# Patient Record
Sex: MALE | Race: WHITE | NOT HISPANIC OR LATINO | Employment: FULL TIME | URBAN - METROPOLITAN AREA
[De-identification: names, ages, dates, MRNs, and addresses within clinical notes are randomized per-mention and may not be internally consistent; named-entity substitution may affect disease eponyms.]

---

## 2021-11-30 ENCOUNTER — NON-PROVIDER VISIT (OUTPATIENT)
Dept: OCCUPATIONAL MEDICINE | Facility: CLINIC | Age: 46
End: 2021-11-30

## 2021-11-30 DIAGNOSIS — Z02.1 PRE-EMPLOYMENT DRUG SCREENING: ICD-10-CM

## 2021-11-30 LAB
AMP AMPHETAMINE: NORMAL
COC COCAINE: NORMAL
INT CON NEG: NORMAL
INT CON POS: NORMAL
MET METHAMPHETAMINES: NORMAL
OPI OPIATES: NORMAL
PCP PHENCYCLIDINE: NORMAL
POC DRUG COMMENT 753798-OCCUPATIONAL HEALTH: NORMAL
THC: NORMAL

## 2021-11-30 PROCEDURE — 80305 DRUG TEST PRSMV DIR OPT OBS: CPT | Performed by: PREVENTIVE MEDICINE

## 2024-06-11 ENCOUNTER — HOSPITAL ENCOUNTER (OUTPATIENT)
Dept: RADIOLOGY | Facility: MEDICAL CENTER | Age: 49
End: 2024-06-11

## 2024-06-11 ENCOUNTER — APPOINTMENT (OUTPATIENT)
Dept: RADIOLOGY | Facility: MEDICAL CENTER | Age: 49
DRG: 603 | End: 2024-06-11
Attending: EMERGENCY MEDICINE
Payer: COMMERCIAL

## 2024-06-11 ENCOUNTER — HOSPITAL ENCOUNTER (INPATIENT)
Facility: MEDICAL CENTER | Age: 49
LOS: 1 days | DRG: 603 | End: 2024-06-12
Attending: EMERGENCY MEDICINE | Admitting: STUDENT IN AN ORGANIZED HEALTH CARE EDUCATION/TRAINING PROGRAM
Payer: COMMERCIAL

## 2024-06-11 DIAGNOSIS — K11.1 ENLARGEMENT OF SUBMANDIBULAR GLAND: ICD-10-CM

## 2024-06-11 DIAGNOSIS — R22.1 NECK SWELLING: ICD-10-CM

## 2024-06-11 DIAGNOSIS — R22.1 NECK MASS: ICD-10-CM

## 2024-06-11 PROBLEM — D72.825 BANDEMIA: Status: ACTIVE | Noted: 2024-06-11

## 2024-06-11 LAB
ALBUMIN SERPL BCP-MCNC: 3.7 G/DL (ref 3.2–4.9)
ALBUMIN/GLOB SERPL: 1.1 G/DL
ALP SERPL-CCNC: 74 U/L (ref 30–99)
ALT SERPL-CCNC: 12 U/L (ref 2–50)
ANION GAP SERPL CALC-SCNC: 15 MMOL/L (ref 7–16)
AST SERPL-CCNC: 15 U/L (ref 12–45)
BASOPHILS # BLD AUTO: 0.5 % (ref 0–1.8)
BASOPHILS # BLD: 0.06 K/UL (ref 0–0.12)
BILIRUB SERPL-MCNC: 0.2 MG/DL (ref 0.1–1.5)
BUN SERPL-MCNC: 9 MG/DL (ref 8–22)
CALCIUM ALBUM COR SERPL-MCNC: 9 MG/DL (ref 8.5–10.5)
CALCIUM SERPL-MCNC: 8.8 MG/DL (ref 8.5–10.5)
CHLORIDE SERPL-SCNC: 103 MMOL/L (ref 96–112)
CO2 SERPL-SCNC: 17 MMOL/L (ref 20–33)
CREAT SERPL-MCNC: 0.77 MG/DL (ref 0.5–1.4)
EOSINOPHIL # BLD AUTO: 0.02 K/UL (ref 0–0.51)
EOSINOPHIL NFR BLD: 0.2 % (ref 0–6.9)
ERYTHROCYTE [DISTWIDTH] IN BLOOD BY AUTOMATED COUNT: 41.1 FL (ref 35.9–50)
FLUAV RNA SPEC QL NAA+PROBE: NEGATIVE
FLUBV RNA SPEC QL NAA+PROBE: NEGATIVE
GFR SERPLBLD CREATININE-BSD FMLA CKD-EPI: 110 ML/MIN/1.73 M 2
GLOBULIN SER CALC-MCNC: 3.4 G/DL (ref 1.9–3.5)
GLUCOSE SERPL-MCNC: 112 MG/DL (ref 65–99)
HCT VFR BLD AUTO: 39.8 % (ref 42–52)
HGB BLD-MCNC: 14 G/DL (ref 14–18)
IMM GRANULOCYTES # BLD AUTO: 0.07 K/UL (ref 0–0.11)
IMM GRANULOCYTES NFR BLD AUTO: 0.6 % (ref 0–0.9)
LACTATE SERPL-SCNC: 1.3 MMOL/L (ref 0.5–2)
LYMPHOCYTES # BLD AUTO: 2.87 K/UL (ref 1–4.8)
LYMPHOCYTES NFR BLD: 22.8 % (ref 22–41)
MCH RBC QN AUTO: 31.9 PG (ref 27–33)
MCHC RBC AUTO-ENTMCNC: 35.2 G/DL (ref 32.3–36.5)
MCV RBC AUTO: 90.7 FL (ref 81.4–97.8)
MONOCYTES # BLD AUTO: 1.26 K/UL (ref 0–0.85)
MONOCYTES NFR BLD AUTO: 10 % (ref 0–13.4)
NEUTROPHILS # BLD AUTO: 8.29 K/UL (ref 1.82–7.42)
NEUTROPHILS NFR BLD: 65.9 % (ref 44–72)
NRBC # BLD AUTO: 0 K/UL
NRBC BLD-RTO: 0 /100 WBC (ref 0–0.2)
PLATELET # BLD AUTO: 430 K/UL (ref 164–446)
PMV BLD AUTO: 8.9 FL (ref 9–12.9)
POTASSIUM SERPL-SCNC: 4.1 MMOL/L (ref 3.6–5.5)
PROT SERPL-MCNC: 7.1 G/DL (ref 6–8.2)
RBC # BLD AUTO: 4.39 M/UL (ref 4.7–6.1)
RSV RNA SPEC QL NAA+PROBE: NEGATIVE
S PYO DNA SPEC NAA+PROBE: DETECTED
SARS-COV-2 RNA RESP QL NAA+PROBE: NOTDETECTED
SODIUM SERPL-SCNC: 135 MMOL/L (ref 135–145)
WBC # BLD AUTO: 12.6 K/UL (ref 4.8–10.8)

## 2024-06-11 PROCEDURE — 770006 HCHG ROOM/CARE - MED/SURG/GYN SEMI*

## 2024-06-11 PROCEDURE — 36415 COLL VENOUS BLD VENIPUNCTURE: CPT

## 2024-06-11 PROCEDURE — A9270 NON-COVERED ITEM OR SERVICE: HCPCS | Performed by: STUDENT IN AN ORGANIZED HEALTH CARE EDUCATION/TRAINING PROGRAM

## 2024-06-11 PROCEDURE — 700105 HCHG RX REV CODE 258: Performed by: OTOLARYNGOLOGY

## 2024-06-11 PROCEDURE — 700102 HCHG RX REV CODE 250 W/ 637 OVERRIDE(OP): Performed by: STUDENT IN AN ORGANIZED HEALTH CARE EDUCATION/TRAINING PROGRAM

## 2024-06-11 PROCEDURE — 71045 X-RAY EXAM CHEST 1 VIEW: CPT

## 2024-06-11 PROCEDURE — 87651 STREP A DNA AMP PROBE: CPT

## 2024-06-11 PROCEDURE — 87040 BLOOD CULTURE FOR BACTERIA: CPT

## 2024-06-11 PROCEDURE — 83605 ASSAY OF LACTIC ACID: CPT

## 2024-06-11 PROCEDURE — 99223 1ST HOSP IP/OBS HIGH 75: CPT | Performed by: STUDENT IN AN ORGANIZED HEALTH CARE EDUCATION/TRAINING PROGRAM

## 2024-06-11 PROCEDURE — 700105 HCHG RX REV CODE 258: Performed by: STUDENT IN AN ORGANIZED HEALTH CARE EDUCATION/TRAINING PROGRAM

## 2024-06-11 PROCEDURE — 96374 THER/PROPH/DIAG INJ IV PUSH: CPT

## 2024-06-11 PROCEDURE — 0241U HCHG SARS-COV-2 COVID-19 NFCT DS RESP RNA 4 TRGT ED POC: CPT

## 2024-06-11 PROCEDURE — 700111 HCHG RX REV CODE 636 W/ 250 OVERRIDE (IP): Performed by: EMERGENCY MEDICINE

## 2024-06-11 PROCEDURE — 99285 EMERGENCY DEPT VISIT HI MDM: CPT

## 2024-06-11 PROCEDURE — 700111 HCHG RX REV CODE 636 W/ 250 OVERRIDE (IP): Performed by: OTOLARYNGOLOGY

## 2024-06-11 PROCEDURE — 80053 COMPREHEN METABOLIC PANEL: CPT

## 2024-06-11 PROCEDURE — 85025 COMPLETE CBC W/AUTO DIFF WBC: CPT

## 2024-06-11 PROCEDURE — 96375 TX/PRO/DX INJ NEW DRUG ADDON: CPT

## 2024-06-11 RX ORDER — POLYETHYLENE GLYCOL 3350 17 G/17G
1 POWDER, FOR SOLUTION ORAL
Status: DISCONTINUED | OUTPATIENT
Start: 2024-06-11 | End: 2024-06-12 | Stop reason: HOSPADM

## 2024-06-11 RX ORDER — SODIUM CHLORIDE 9 MG/ML
INJECTION, SOLUTION INTRAVENOUS CONTINUOUS
Status: DISCONTINUED | OUTPATIENT
Start: 2024-06-11 | End: 2024-06-12

## 2024-06-11 RX ORDER — PROMETHAZINE HYDROCHLORIDE 25 MG/1
12.5-25 SUPPOSITORY RECTAL EVERY 4 HOURS PRN
Status: DISCONTINUED | OUTPATIENT
Start: 2024-06-11 | End: 2024-06-12 | Stop reason: HOSPADM

## 2024-06-11 RX ORDER — CEFAZOLIN 2 G/1
2 INJECTION, POWDER, FOR SOLUTION INTRAMUSCULAR; INTRAVENOUS ONCE
Status: COMPLETED | OUTPATIENT
Start: 2024-06-11 | End: 2024-06-11

## 2024-06-11 RX ORDER — AMOXICILLIN 250 MG
2 CAPSULE ORAL EVERY EVENING
Status: DISCONTINUED | OUTPATIENT
Start: 2024-06-11 | End: 2024-06-12 | Stop reason: HOSPADM

## 2024-06-11 RX ORDER — ENOXAPARIN SODIUM 100 MG/ML
40 INJECTION SUBCUTANEOUS DAILY
Status: DISCONTINUED | OUTPATIENT
Start: 2024-06-11 | End: 2024-06-12 | Stop reason: HOSPADM

## 2024-06-11 RX ORDER — DEXAMETHASONE SODIUM PHOSPHATE 4 MG/ML
10 INJECTION, SOLUTION INTRA-ARTICULAR; INTRALESIONAL; INTRAMUSCULAR; INTRAVENOUS; SOFT TISSUE EVERY 8 HOURS
Status: DISCONTINUED | OUTPATIENT
Start: 2024-06-11 | End: 2024-06-11

## 2024-06-11 RX ORDER — ACETAMINOPHEN 325 MG/1
650 TABLET ORAL EVERY 6 HOURS PRN
Status: DISCONTINUED | OUTPATIENT
Start: 2024-06-11 | End: 2024-06-12 | Stop reason: HOSPADM

## 2024-06-11 RX ORDER — METHYLPREDNISOLONE SODIUM SUCCINATE 125 MG/2ML
125 INJECTION, POWDER, LYOPHILIZED, FOR SOLUTION INTRAMUSCULAR; INTRAVENOUS ONCE
Status: COMPLETED | OUTPATIENT
Start: 2024-06-11 | End: 2024-06-11

## 2024-06-11 RX ORDER — PROCHLORPERAZINE EDISYLATE 5 MG/ML
5-10 INJECTION INTRAMUSCULAR; INTRAVENOUS EVERY 4 HOURS PRN
Status: DISCONTINUED | OUTPATIENT
Start: 2024-06-11 | End: 2024-06-12 | Stop reason: HOSPADM

## 2024-06-11 RX ORDER — PROMETHAZINE HYDROCHLORIDE 25 MG/1
12.5-25 TABLET ORAL EVERY 4 HOURS PRN
Status: DISCONTINUED | OUTPATIENT
Start: 2024-06-11 | End: 2024-06-12 | Stop reason: HOSPADM

## 2024-06-11 RX ORDER — METHYLPREDNISOLONE SODIUM SUCCINATE 40 MG/ML
40 INJECTION, POWDER, LYOPHILIZED, FOR SOLUTION INTRAMUSCULAR; INTRAVENOUS EVERY 8 HOURS
Qty: 9 ML | Refills: 0 | Status: DISCONTINUED | OUTPATIENT
Start: 2024-06-11 | End: 2024-06-11

## 2024-06-11 RX ORDER — ONDANSETRON 2 MG/ML
4 INJECTION INTRAMUSCULAR; INTRAVENOUS EVERY 4 HOURS PRN
Status: DISCONTINUED | OUTPATIENT
Start: 2024-06-11 | End: 2024-06-12 | Stop reason: HOSPADM

## 2024-06-11 RX ORDER — ONDANSETRON 4 MG/1
4 TABLET, ORALLY DISINTEGRATING ORAL EVERY 4 HOURS PRN
Status: DISCONTINUED | OUTPATIENT
Start: 2024-06-11 | End: 2024-06-12 | Stop reason: HOSPADM

## 2024-06-11 RX ADMIN — SODIUM CHLORIDE: 9 INJECTION, SOLUTION INTRAVENOUS at 18:00

## 2024-06-11 RX ADMIN — DEXAMETHASONE SODIUM PHOSPHATE 10 MG: 4 INJECTION INTRA-ARTICULAR; INTRALESIONAL; INTRAMUSCULAR; INTRAVENOUS; SOFT TISSUE at 16:30

## 2024-06-11 RX ADMIN — SODIUM CHLORIDE: 9 INJECTION, SOLUTION INTRAVENOUS at 13:56

## 2024-06-11 RX ADMIN — METHYLPREDNISOLONE SODIUM SUCCINATE 125 MG: 125 INJECTION, POWDER, FOR SOLUTION INTRAMUSCULAR; INTRAVENOUS at 13:56

## 2024-06-11 RX ADMIN — AMPICILLIN AND SULBACTAM 3 G: 1; 2 INJECTION, POWDER, FOR SOLUTION INTRAMUSCULAR; INTRAVENOUS at 18:01

## 2024-06-11 RX ADMIN — CEFAZOLIN 2 G: 2 INJECTION, POWDER, FOR SOLUTION INTRAMUSCULAR; INTRAVENOUS at 13:59

## 2024-06-11 RX ADMIN — AMPICILLIN AND SULBACTAM 3 G: 1; 2 INJECTION, POWDER, FOR SOLUTION INTRAMUSCULAR; INTRAVENOUS at 23:36

## 2024-06-11 RX ADMIN — SENNOSIDES AND DOCUSATE SODIUM 2 TABLET: 50; 8.6 TABLET ORAL at 18:02

## 2024-06-11 SDOH — ECONOMIC STABILITY: TRANSPORTATION INSECURITY
IN THE PAST 12 MONTHS, HAS LACK OF RELIABLE TRANSPORTATION KEPT YOU FROM MEDICAL APPOINTMENTS, MEETINGS, WORK OR FROM GETTING THINGS NEEDED FOR DAILY LIVING?: NO

## 2024-06-11 SDOH — ECONOMIC STABILITY: TRANSPORTATION INSECURITY
IN THE PAST 12 MONTHS, HAS THE LACK OF TRANSPORTATION KEPT YOU FROM MEDICAL APPOINTMENTS OR FROM GETTING MEDICATIONS?: NO

## 2024-06-11 ASSESSMENT — ENCOUNTER SYMPTOMS
NAUSEA: 0
SHORTNESS OF BREATH: 0
NECK PAIN: 1
COUGH: 0
ABDOMINAL PAIN: 0
CHILLS: 1
FEVER: 1
VOMITING: 0

## 2024-06-11 ASSESSMENT — COGNITIVE AND FUNCTIONAL STATUS - GENERAL
MOBILITY SCORE: 24
SUGGESTED CMS G CODE MODIFIER MOBILITY: CH
SUGGESTED CMS G CODE MODIFIER DAILY ACTIVITY: CH
DAILY ACTIVITIY SCORE: 24

## 2024-06-11 ASSESSMENT — SOCIAL DETERMINANTS OF HEALTH (SDOH)
WITHIN THE LAST YEAR, HAVE TO BEEN RAPED OR FORCED TO HAVE ANY KIND OF SEXUAL ACTIVITY BY YOUR PARTNER OR EX-PARTNER?: NO
WITHIN THE PAST 12 MONTHS, YOU WORRIED THAT YOUR FOOD WOULD RUN OUT BEFORE YOU GOT THE MONEY TO BUY MORE: NEVER TRUE
IN THE PAST 12 MONTHS, HAS THE ELECTRIC, GAS, OIL, OR WATER COMPANY THREATENED TO SHUT OFF SERVICE IN YOUR HOME?: NO
WITHIN THE LAST YEAR, HAVE YOU BEEN KICKED, HIT, SLAPPED, OR OTHERWISE PHYSICALLY HURT BY YOUR PARTNER OR EX-PARTNER?: NO
IN THE PAST 12 MONTHS, HAS THE ELECTRIC, GAS, OIL, OR WATER COMPANY THREATENED TO SHUT OFF SERVICE IN YOUR HOME?: NO
WITHIN THE LAST YEAR, HAVE YOU BEEN AFRAID OF YOUR PARTNER OR EX-PARTNER?: NO
WITHIN THE PAST 12 MONTHS, THE FOOD YOU BOUGHT JUST DIDN'T LAST AND YOU DIDN'T HAVE MONEY TO GET MORE: NEVER TRUE
WITHIN THE PAST 12 MONTHS, YOU WORRIED THAT YOUR FOOD WOULD RUN OUT BEFORE YOU GOT THE MONEY TO BUY MORE: NEVER TRUE
WITHIN THE LAST YEAR, HAVE YOU BEEN HUMILIATED OR EMOTIONALLY ABUSED IN OTHER WAYS BY YOUR PARTNER OR EX-PARTNER?: NO
WITHIN THE PAST 12 MONTHS, THE FOOD YOU BOUGHT JUST DIDN'T LAST AND YOU DIDN'T HAVE MONEY TO GET MORE: NEVER TRUE

## 2024-06-11 ASSESSMENT — LIFESTYLE VARIABLES
AVERAGE NUMBER OF DAYS PER WEEK YOU HAVE A DRINK CONTAINING ALCOHOL: 0
HAVE PEOPLE ANNOYED YOU BY CRITICIZING YOUR DRINKING: NO
ON A TYPICAL DAY WHEN YOU DRINK ALCOHOL HOW MANY DRINKS DO YOU HAVE: 0
ALCOHOL_USE: NO
TOTAL SCORE: 0
DOES PATIENT WANT TO STOP DRINKING: NO
CONSUMPTION TOTAL: NEGATIVE
TOTAL SCORE: 0
EVER HAD A DRINK FIRST THING IN THE MORNING TO STEADY YOUR NERVES TO GET RID OF A HANGOVER: NO
EVER FELT BAD OR GUILTY ABOUT YOUR DRINKING: NO
TOTAL SCORE: 0
HAVE YOU EVER FELT YOU SHOULD CUT DOWN ON YOUR DRINKING: NO
HOW MANY TIMES IN THE PAST YEAR HAVE YOU HAD 5 OR MORE DRINKS IN A DAY: 0

## 2024-06-11 ASSESSMENT — PAIN DESCRIPTION - PAIN TYPE
TYPE: ACUTE PAIN
TYPE: ACUTE PAIN

## 2024-06-11 ASSESSMENT — PATIENT HEALTH QUESTIONNAIRE - PHQ9
SUM OF ALL RESPONSES TO PHQ9 QUESTIONS 1 AND 2: 0
2. FEELING DOWN, DEPRESSED, IRRITABLE, OR HOPELESS: NOT AT ALL
1. LITTLE INTEREST OR PLEASURE IN DOING THINGS: NOT AT ALL

## 2024-06-11 ASSESSMENT — FIBROSIS 4 INDEX
FIB4 SCORE: 0.48
FIB4 SCORE: 0.48

## 2024-06-11 NOTE — ED TRIAGE NOTES
Chief Complaint   Patient presents with    Sent by MD     Sent from [place] by private vehicle. for swollen left neck lymph node x5 days after using massager on neck about 5 days ago. Fevers and sore neck for ten days off and on resolved a couple days ago. Hx of dental surgery in December.      [facility]    A/ox4. No signs of acute distress. Educated on Triage Process and wait time. Placed in lobby with instructions to notify staff of any change in condition.

## 2024-06-11 NOTE — ED NOTES
Pt ambulated to room [facility] 18 with steady gait. Pt placed on monitors. Chart up for ERP.     Urine sample collected and sent to lab.

## 2024-06-11 NOTE — ED PROVIDER NOTES
"ER Provider Note    Scribed for Mac Carrillo D.O. by Jaren Lowe. 6/11/2024  12:14 PM    Primary Care Provider: Pcp Pt States None    CHIEF COMPLAINT  Chief Complaint   Patient presents with    Sent by MD     Sent from [place] by private vehicle. for swollen left neck lymph node x5 days after using massager on neck about 5 days ago. Fevers and sore neck for ten days off and on resolved a couple days ago. Hx of dental surgery in December.       HPI/ROS  LIMITATION TO HISTORY   None    OUTSIDE HISTORIAN(S):  None    Paulo Fournier is a 48 y.o. male who presents to the Emergency Department for neck swelling for a couple of days. The patient was sent here from [place] and had a CT done there. His story is that he had tension in his neck about 5 days ago, and then attempted to alleviate it with a massager. When he woke up this morning, he states that his neck had significant swelling. He denies neck pain, dental pain, or issues with swallowing or breathing.     ROS as per HPI.    PAST MEDICAL HISTORY  History reviewed. No pertinent past medical history.    SURGICAL HISTORY  History reviewed. No pertinent surgical history.    FAMILY HISTORY  History reviewed. No pertinent family history.    SOCIAL HISTORY   reports that he has never smoked. He has never used smokeless tobacco. He reports that he does not currently use alcohol. He reports current drug use.    CURRENT MEDICATIONS  Previous Medications    No medications noted.     ALLERGIES  Patient has no known allergies.    PHYSICAL EXAM  /79   Pulse 71   Temp 37.8 °C (100 °F) (Temporal)   Resp 18   Ht 1.778 m (5' 10\")   Wt 73.1 kg (161 lb 2.5 oz)   SpO2 100%   BMI 23.12 kg/m²     General: No acute distress.  HENT: Normocephalic, Mucus membranes are moist. Left anterior lateral neck swelling. No erythema. No tenderness. Pharynx is normal.   Chest: Lungs have even and unlabored respirations, Clear to auscultation. No stridor.   Cardiovascular: " Regular rate and regular rhythm, No peripheral cyanosis.  Abdomen: Non distended.  Neuro: Awake, Conversive, Able to relay recent events.  Psychiatric: Calm and cooperative.     EXTERNAL RECORDS REVIEWED  Review of patient's past medical records shows a CT with an 8 mm mass. Left aspect of the epiglottis is thickened. Laryngeal airway is partially effaced. Bilateral cervical adenopathy is greatest on the left. Diffuse soft tissue swelling. Left submandibular gland is enlarged.     INITIAL ASSESSMENT  This patient presents with neck swelling. CT shows no mass, but there is surrounding signs of inflammation. He will be started on IV antibiotics and will admit and consult ENT.     ED Observation Status? No; Patient does not meet criteria for ED Observation.     DIAGNOSTIC STUDIES    Labs:   Results for orders placed or performed during the hospital encounter of 06/11/24   COMP METABOLIC PANEL   Result Value Ref Range    Sodium 135 135 - 145 mmol/L    Potassium 4.1 3.6 - 5.5 mmol/L    Chloride 103 96 - 112 mmol/L    Co2 17 (L) 20 - 33 mmol/L    Anion Gap 15.0 7.0 - 16.0    Glucose 112 (H) 65 - 99 mg/dL    Bun 9 8 - 22 mg/dL    Creatinine 0.77 0.50 - 1.40 mg/dL    Calcium 8.8 8.5 - 10.5 mg/dL    Correct Calcium 9.0 8.5 - 10.5 mg/dL    AST(SGOT) 15 12 - 45 U/L    ALT(SGPT) 12 2 - 50 U/L    Alkaline Phosphatase 74 30 - 99 U/L    Total Bilirubin 0.2 0.1 - 1.5 mg/dL    Albumin 3.7 3.2 - 4.9 g/dL    Total Protein 7.1 6.0 - 8.2 g/dL    Globulin 3.4 1.9 - 3.5 g/dL    A-G Ratio 1.1 g/dL   Lactic Acid   Result Value Ref Range    Lactic Acid 1.3 0.5 - 2.0 mmol/L   CBC with Differential   Result Value Ref Range    WBC 12.6 (H) 4.8 - 10.8 K/uL    RBC 4.39 (L) 4.70 - 6.10 M/uL    Hemoglobin 14.0 14.0 - 18.0 g/dL    Hematocrit 39.8 (L) 42.0 - 52.0 %    MCV 90.7 81.4 - 97.8 fL    MCH 31.9 27.0 - 33.0 pg    MCHC 35.2 32.3 - 36.5 g/dL    RDW 41.1 35.9 - 50.0 fL    Platelet Count 430 164 - 446 K/uL    MPV 8.9 (L) 9.0 - 12.9 fL     Neutrophils-Polys 65.90 44.00 - 72.00 %    Lymphocytes 22.80 22.00 - 41.00 %    Monocytes 10.00 0.00 - 13.40 %    Eosinophils 0.20 0.00 - 6.90 %    Basophils 0.50 0.00 - 1.80 %    Immature Granulocytes 0.60 0.00 - 0.90 %    Nucleated RBC 0.00 0.00 - 0.20 /100 WBC    Neutrophils (Absolute) 8.29 (H) 1.82 - 7.42 K/uL    Lymphs (Absolute) 2.87 1.00 - 4.80 K/uL    Monos (Absolute) 1.26 (H) 0.00 - 0.85 K/uL    Eos (Absolute) 0.02 0.00 - 0.51 K/uL    Baso (Absolute) 0.06 0.00 - 0.12 K/uL    Immature Granulocytes (abs) 0.07 0.00 - 0.11 K/uL    NRBC (Absolute) 0.00 K/uL   ESTIMATED GFR   Result Value Ref Range    GFR (CKD-EPI) 110 >60 mL/min/1.73 m 2   Group A Strep by PCR    Specimen: Throat   Result Value Ref Range    Group A Strep by PCR DETECTED (A) Not Detected   POC CoV-2, FLU A/B, RSV by PCR   Result Value Ref Range    POC Influenza A RNA, PCR Negative Negative    POC Influenza B RNA, PCR Negative Negative    POC RSV, by PCR Negative Negative    POC SARS-CoV-2, PCR NotDetected         Radiology:   The attending emergency physician has independently interpreted the diagnostic imaging associated with this visit and am waiting the final reading from the radiologist.   Preliminary interpretation is as follows: Chest x-ray no acute disease  Radiologist interpretation:   DX-CHEST-PORTABLE (1 VIEW)   Final Result      No acute cardiopulmonary abnormality.      OUTSIDE IMAGES-CT NECK   Final Result           COURSE & MEDICAL DECISION MAKING     COURSE AND PLAN  12:14 PM - Patient seen and examined at bedside. Discussed plan of care, including medications, imaging, and labs. Patient agrees to the plan of care. The patient will be medicated with SOLU-MEDROL 125 mg injection and Ancef 2 g injection. Ordered for DX-Chest, lactic acid, CBC with diff, blood culture, and CMP to evaluate his symptoms.     12:35 PM - I reevaluated the patient at bedside.     12:42 PM - Paged ENT    12:46 PM - I spoke to Dr. Rapp ([facility])  who agrees to admit the patient for hospitalization.    1:41 PM - I spoke with Dr. Hyde (ENT) about the patient's case.     ED Summary: This patient presents with swelling of the left neck.  There is no abscess and no fluid collection.  There is submandibular swelling and thickening of the epiglottis.  He has no airway obstruction, no stridor he is swallowing and handling his secretions well.    He was started on IV antibiotics and steroids here.  I discussed the case with the hospitalist for admission I discussed with the ENT for consultation.      DISPOSITION AND DISCUSSIONS  I have discussed management of the patient with the following physicians and ROMINA's: Dr. Rapp (Family Medicine)    DISPOSITION:  Patient will be hospitalized by Dr. Rapp ([facility]) in guarded condition.     FINAL DIAGNOSIS  1. Neck swelling    2. Enlargement of submandibular gland    3. Neck mass        Jaren ALMANZA (Aakashibkathleen), am scribing for, and in the presence of, Mac Carrillo D.O..    Electronically signed by: Jaren Lowe (Scribe), 6/11/2024    IMac D.O. personally performed the services described in this documentation, as scribed by Jaren Lowe in my presence, and it is both accurate and complete.     The note accurately reflects work and decisions made by me.  Mac Carrillo D.O.  6/11/2024  7:13 PM

## 2024-06-11 NOTE — HOSPITAL COURSE
Paulo Fournier is a 48 yr old male with no past medical history. Admitted 6/11 for left neck swelling.     Patient transferred from [facility]. Patient states he has had fever, chills and neck swelling for 10 days. No ear pain. Intermittent cough is present. Patient had dental extraction in Jan 2024; uncomplicated.     CT soft tissue neck:   LEFT LARYNGEAL WALL EXHIBITS DIFFUSE SOFT TISSUE THICKENING WITH A SUSPECTED 8 MM MASS PRESENT.   LEFT ASPECT OF THE EPIGLOTTIS IS THICKENED.   LARYNGEAL AIRWAY IS PARTIALLY EFFACED.     Vitals: Stable. HR 70-80s. /79. Afebrile  Labs: WBC 12.6, from 15.5.     ENT, DR. King, was consulted by EDP. Patient was started on IV cefazolin and IV solu medrol. Blood cultures collected after antibiotics had been started.

## 2024-06-11 NOTE — ASSESSMENT & PLAN NOTE
Left neck swelling over 10 days. Appears infectious in etiology; cannot exclude malignancy.   ENT consulted by EDP; no surgical interventions recommended at this time   - Start IV Unasyn  - Start solumedrol   - NPO until bedside swallow eval completed   - Group A strep PCR pending  - Full viral panel pending   - Tylenol for mild pain  - Oxycodone for moderate pain  - Will need to follow up with ENT outpatient

## 2024-06-11 NOTE — CONSULTS
Date of Service:  6/11/24    PCP: Pcp Pt States None    CC:  L neck swelling x 5 days    HPI: This is a 48 y.o. male with URI 5 days ago, fever, sore throat who has had L neck swelling x 5 days.  Prev ST, resolved, no recent dental issues. Has night sweats, fever, some weight loss.  No pain, sob, voice changes.  FEels much better      ROS: As above. The remainder of a complete review of systems is negative in all systems except as noted.    PMHx:  Active Ambulatory Problems     Diagnosis Date Noted    No Active Ambulatory Problems     Resolved Ambulatory Problems     Diagnosis Date Noted    No Resolved Ambulatory Problems     No Additional Past Medical History       SHx:  Social History     Socioeconomic History    Marital status: Single     Spouse name: Not on file    Number of children: Not on file    Years of education: Not on file    Highest education level: Not on file   Occupational History    Not on file   Tobacco Use    Smoking status: Never    Smokeless tobacco: Never   Substance and Sexual Activity    Alcohol use: Not Currently    Drug use: Yes     Comment: Marijuana    Sexual activity: Not on file   Other Topics Concern    Not on file   Social History Narrative    Not on file     Social Determinants of Health     Financial Resource Strain: Not on file   Food Insecurity: No Food Insecurity (6/11/2024)    Hunger Vital Sign     Worried About Running Out of Food in the Last Year: Never true     Ran Out of Food in the Last Year: Never true   Transportation Needs: No Transportation Needs (6/11/2024)    PRAPARE - Transportation     Lack of Transportation (Medical): No     Lack of Transportation (Non-Medical): No   Physical Activity: Not on file   Stress: Not on file   Social Connections: Not on file   Intimate Partner Violence: Not At Risk (6/11/2024)    Humiliation, Afraid, Rape, and Kick questionnaire     Fear of Current or Ex-Partner: No     Emotionally Abused: No     Physically Abused: No     Sexually  "Abused: No   Housing Stability: Low Risk  (6/11/2024)    Housing Stability Vital Sign     Unable to Pay for Housing in the Last Year: No     Number of Places Lived in the Last Year: 2     Unstable Housing in the Last Year: No       FHx:  family history is not on file.    Allergies:  No Known Allergies    Medications:  No current facility-administered medications on file prior to encounter.     No current outpatient medications on file prior to encounter.       Objective Exam:  Vitals:    06/11/24 1455 06/11/24 1501 06/11/24 1545 06/11/24 1558   BP:  122/77 133/71    Pulse: 66 69 62    Resp: 15 14 17    Temp:   36.9 °C (98.4 °F)    TempSrc:   Temporal    SpO2: 96% 94% 97%    Weight:   73.1 kg (161 lb 2.5 oz) 73.1 kg (161 lb 2.5 oz)   Height:   1.778 m (5' 10\")        General: nad  Full neck rom  Nml tonsils, no trismus, nml strep throat   HEENT: tender L level 2 3cm LN   Fom flat, non tender, no pus from duct      Flex laryngoscopy after verbal consent   Nml R NC, NP, epiglottis, BOT, vallecula, TVC nml motion. No edema, no mass   Laboratory--reviewed personally and are as follows:  Lab Results   Component Value Date/Time    WBC 12.6 (H) 06/11/2024 11:47 AM    WBC 15.50 (H) 06/11/2024 08:45 AM    RBC 4.39 (L) 06/11/2024 11:47 AM    RBC 4.58 06/11/2024 08:45 AM    HEMOGLOBIN 14.0 06/11/2024 11:47 AM    HEMOGLOBIN 14.4 06/11/2024 08:45 AM    HEMATOCRIT 39.8 (L) 06/11/2024 11:47 AM    HEMATOCRIT 42.9 06/11/2024 08:45 AM    MCV 90.7 06/11/2024 11:47 AM    MCV 93.7 06/11/2024 08:45 AM    MCH 31.9 06/11/2024 11:47 AM    MCH 31.4 06/11/2024 08:45 AM    MCHC 35.2 06/11/2024 11:47 AM    MCHC 33.5 06/11/2024 08:45 AM    MPV 8.9 (L) 06/11/2024 11:47 AM    MPV 6.5 (L) 06/11/2024 08:45 AM    NEUTSPOLYS 65.90 06/11/2024 11:47 AM    NEUTSPOLYS 70.6 06/11/2024 08:45 AM    LYMPHOCYTES 22.80 06/11/2024 11:47 AM    LYMPHOCYTES 16.9 (L) 06/11/2024 08:45 AM    MONOCYTES 10.00 06/11/2024 11:47 AM    MONOCYTES 11.1 (H) 06/11/2024 08:45 AM " "   EOSINOPHILS 0.20 06/11/2024 11:47 AM    EOSINOPHILS 0.7 (L) 06/11/2024 08:45 AM    BASOPHILS 0.50 06/11/2024 11:47 AM    BASOPHILS 0.7 06/11/2024 08:45 AM      Lab Results   Component Value Date/Time    SODIUM 135 06/11/2024 11:47 AM    SODIUM 137 06/11/2024 08:45 AM    POTASSIUM 4.1 06/11/2024 11:47 AM    POTASSIUM 4.2 06/11/2024 08:45 AM    CHLORIDE 103 06/11/2024 11:47 AM    CHLORIDE 108 06/11/2024 08:45 AM    CO2 17 (L) 06/11/2024 11:47 AM    CO2 21.0 06/11/2024 08:45 AM    GLUCOSE 112 (H) 06/11/2024 11:47 AM    GLUCOSE 107 06/11/2024 08:45 AM    BUN 9 06/11/2024 11:47 AM    BUN 11.0 06/11/2024 08:45 AM    CREATININE 0.77 06/11/2024 11:47 AM    CREATININE 0.90 06/11/2024 08:45 AM    BUNCREATRAT 12.2 06/11/2024 08:45 AM      No results found for: \"PROTHROMBTM\", \"INR\"     Radiology  Ct neck reviewed L>R LAD level 2, no abscess      MDM:  48 y.o. male here with L lymphadenopathy x 5 days.  + strep swab.    No outpt abx prior.  Feels better, no airway edema now.     Plan  Stop steroids   Cont pulse ox   Diet per speech and swallow   Unasyn, can be discharged on augmentin tomorrow, f/u 2 weeks with me as out pt   Will sign off        "

## 2024-06-11 NOTE — ED NOTES
Pt transported to floor with transport tech. Pt left ED GCS 15. Report to floor RN. All patient belongings with patient to inpatient unit.

## 2024-06-11 NOTE — H&P
Hospital Medicine History & Physical Note    Date of Service  6/11/2024    Primary Care Physician  Pcp Pt States None    Consultants  ENT    Specialist Names: Dr. King     Code Status  Full Code    Chief Complaint  Chief Complaint   Patient presents with    Sent by MD     Sent from [place] by private vehicle. for swollen left neck lymph node x5 days after using massager on neck about 5 days ago. Fevers and sore neck for ten days off and on resolved a couple days ago. Hx of dental surgery in December.       History of Presenting Illness  Paulo Fournier is a 48 yr old male with no past medical history. Admitted 6/11 for left neck swelling.     Patient transferred from [facility]. Patient states he has had fever, chills and neck swelling for 10 days. No ear pain. Intermittent cough is present. Patient had dental extraction in Jan 2024; uncomplicated.     CT soft tissue neck:   LEFT LARYNGEAL WALL EXHIBITS DIFFUSE SOFT TISSUE THICKENING WITH A SUSPECTED 8 MM MASS PRESENT.   LEFT ASPECT OF THE EPIGLOTTIS IS THICKENED.   LARYNGEAL AIRWAY IS PARTIALLY EFFACED.     Vitals: Stable. HR 70-80s. /79. Afebrile  Labs: WBC 12.6, from 15.5.     ENT, DR. King, was consulted by EDP. Patient was started on IV cefazolin and IV solu medrol. Blood cultures collected after antibiotics had been started.     I discussed the plan of care with patient and bedside RN.    Review of Systems  Review of Systems   Constitutional:  Positive for chills, fever and malaise/fatigue.   Respiratory:  Negative for cough and shortness of breath.    Cardiovascular:  Negative for chest pain and leg swelling.   Gastrointestinal:  Negative for abdominal pain, nausea and vomiting.   Musculoskeletal:  Positive for neck pain.       Past Medical History   has no past medical history on file.    Surgical History   has no past surgical history on file.     Family History  family history is not on file.   Family history reviewed with patient. There is no family  history that is pertinent to the chief complaint.     Social History   reports that he has never smoked. He has never used smokeless tobacco. He reports that he does not currently use alcohol. He reports current drug use.    Allergies  No Known Allergies    Medications  None       Physical Exam  Temp:  [37.8 °C (100 °F)] 37.8 °C (100 °F)  Pulse:  [57-73] 69  Resp:  [12-18] 14  BP: (119-133)/(70-79) 122/77  SpO2:  [94 %-100 %] 94 %  Blood Pressure: 133/79   Temperature: 37.8 °C (100 °F)   Pulse: 73   Respiration: 15   Pulse Oximetry: 98 %       Physical Exam  Vitals and nursing note reviewed.   Constitutional:       General: He is not in acute distress.     Appearance: Normal appearance. He is not ill-appearing.   HENT:      Head: Normocephalic.      Comments: Significant left neck swelling; tender to palpation; significant LAD present     Right Ear: Tympanic membrane, ear canal and external ear normal.      Left Ear: Tympanic membrane, ear canal and external ear normal.      Mouth/Throat:      Mouth: Mucous membranes are moist.      Pharynx: Oropharynx is clear.      Comments: Poor dentition, missing teeth  Oropharynx clear, without exudates   Cardiovascular:      Rate and Rhythm: Normal rate and regular rhythm.      Heart sounds: Murmur heard.   Pulmonary:      Effort: Pulmonary effort is normal. No respiratory distress.      Breath sounds: Normal breath sounds. No wheezing.   Abdominal:      General: Abdomen is flat. Bowel sounds are normal. There is no distension.      Palpations: Abdomen is soft.      Tenderness: There is no abdominal tenderness.   Musculoskeletal:         General: No swelling. Normal range of motion.      Cervical back: Normal range of motion. No tenderness.   Skin:     General: Skin is warm and dry.   Neurological:      Mental Status: He is alert and oriented to person, place, and time.   Psychiatric:         Mood and Affect: Mood normal.         Behavior: Behavior normal.  "        Laboratory:  Recent Labs     06/11/24  0845 06/11/24  1147   WBC 15.50* 12.6*   RBC 4.58 4.39*   HEMOGLOBIN 14.4 14.0   HEMATOCRIT 42.9 39.8*   MCV 93.7 90.7   MCH 31.4 31.9   MCHC 33.5 35.2   RDW 13.0 41.1   PLATELETCT 434 430   MPV 6.5* 8.9*     Recent Labs     06/11/24  0845 06/11/24  1147   SODIUM 137 135   POTASSIUM 4.2 4.1   CHLORIDE 108 103   CO2 21.0 17*   GLUCOSE 107 112*   BUN 11.0 9   CREATININE 0.90 0.77   CALCIUM 9.1 8.8     Recent Labs     06/11/24  0845 06/11/24  1147   ALTSGPT  --  12   ASTSGOT  --  15   ALKPHOSPHAT  --  74   TBILIRUBIN  --  0.2   GLUCOSE 107 112*         No results for input(s): \"NTPROBNP\" in the last 72 hours.      No results for input(s): \"TROPONINT\" in the last 72 hours.    Imaging:  DX-CHEST-PORTABLE (1 VIEW)   Final Result      No acute cardiopulmonary abnormality.      OUTSIDE IMAGES-CT NECK   Final Result          X-Ray:  I have personally reviewed the images and compared with prior images.    Assessment/Plan:  Justification for Admission Status  I anticipate this patient will require at least two midnights for appropriate medical management, necessitating inpatient admission because neck swelling/mass    Patient will need a Med/Surg bed on EMERGENCY service .  The need is secondary to neck mass and IV antibiotics .    * Neck mass- (present on admission)  Assessment & Plan  Left neck swelling over 10 days. Appears infectious in etiology; cannot exclude malignancy.   ENT consulted by EDP; no surgical interventions recommended at this time   - Start IV Unasyn  - Start solumedrol   - NPO until bedside swallow eval completed   - Group A strep PCR pending  - Full viral panel pending   - Tylenol for mild pain  - Oxycodone for moderate pain  - Will need to follow up with ENT outpatient    Bandemia  Assessment & Plan  Secondary to soft tissue infection  - Repeat CBC in AM         VTE prophylaxis: SCDs/TEDs and enoxaparin ppx  "

## 2024-06-11 NOTE — ED NOTES
Med Rec complete per patient   Allergies reviewed  Antibiotics in the past 30 days:no  Anticoagulant in past 14 days:no  Pharmacy patient utilizes:[facility] [place]    Pt states he takes no RX medications nor OTC in past 30 days

## 2024-06-12 VITALS
HEIGHT: 70 IN | WEIGHT: 161.16 LBS | RESPIRATION RATE: 18 BRPM | OXYGEN SATURATION: 98 % | BODY MASS INDEX: 23.07 KG/M2 | SYSTOLIC BLOOD PRESSURE: 122 MMHG | HEART RATE: 61 BPM | DIASTOLIC BLOOD PRESSURE: 77 MMHG | TEMPERATURE: 98 F

## 2024-06-12 LAB
ALBUMIN SERPL BCP-MCNC: 3.4 G/DL (ref 3.2–4.9)
ALBUMIN/GLOB SERPL: 1 G/DL
ALP SERPL-CCNC: 58 U/L (ref 30–99)
ALT SERPL-CCNC: 12 U/L (ref 2–50)
ANION GAP SERPL CALC-SCNC: 15 MMOL/L (ref 7–16)
AST SERPL-CCNC: 13 U/L (ref 12–45)
BILIRUB SERPL-MCNC: 0.2 MG/DL (ref 0.1–1.5)
BUN SERPL-MCNC: 14 MG/DL (ref 8–22)
CALCIUM ALBUM COR SERPL-MCNC: 9.2 MG/DL (ref 8.5–10.5)
CALCIUM SERPL-MCNC: 8.7 MG/DL (ref 8.5–10.5)
CHLORIDE SERPL-SCNC: 106 MMOL/L (ref 96–112)
CO2 SERPL-SCNC: 19 MMOL/L (ref 20–33)
CREAT SERPL-MCNC: 0.72 MG/DL (ref 0.5–1.4)
ERYTHROCYTE [DISTWIDTH] IN BLOOD BY AUTOMATED COUNT: 42.8 FL (ref 35.9–50)
GFR SERPLBLD CREATININE-BSD FMLA CKD-EPI: 112 ML/MIN/1.73 M 2
GLOBULIN SER CALC-MCNC: 3.4 G/DL (ref 1.9–3.5)
GLUCOSE SERPL-MCNC: 144 MG/DL (ref 65–99)
HCT VFR BLD AUTO: 40.5 % (ref 42–52)
HGB BLD-MCNC: 14 G/DL (ref 14–18)
MCH RBC QN AUTO: 32.1 PG (ref 27–33)
MCHC RBC AUTO-ENTMCNC: 34.6 G/DL (ref 32.3–36.5)
MCV RBC AUTO: 92.9 FL (ref 81.4–97.8)
PLATELET # BLD AUTO: 393 K/UL (ref 164–446)
PMV BLD AUTO: 8.5 FL (ref 9–12.9)
POTASSIUM SERPL-SCNC: 4.4 MMOL/L (ref 3.6–5.5)
PROT SERPL-MCNC: 6.8 G/DL (ref 6–8.2)
RBC # BLD AUTO: 4.36 M/UL (ref 4.7–6.1)
SODIUM SERPL-SCNC: 140 MMOL/L (ref 135–145)
WBC # BLD AUTO: 7.6 K/UL (ref 4.8–10.8)

## 2024-06-12 PROCEDURE — 80053 COMPREHEN METABOLIC PANEL: CPT

## 2024-06-12 PROCEDURE — 700111 HCHG RX REV CODE 636 W/ 250 OVERRIDE (IP): Mod: JZ | Performed by: OTOLARYNGOLOGY

## 2024-06-12 PROCEDURE — 36415 COLL VENOUS BLD VENIPUNCTURE: CPT

## 2024-06-12 PROCEDURE — 700105 HCHG RX REV CODE 258: Performed by: OTOLARYNGOLOGY

## 2024-06-12 PROCEDURE — 92610 EVALUATE SWALLOWING FUNCTION: CPT

## 2024-06-12 PROCEDURE — 85027 COMPLETE CBC AUTOMATED: CPT

## 2024-06-12 PROCEDURE — 99239 HOSP IP/OBS DSCHRG MGMT >30: CPT | Performed by: STUDENT IN AN ORGANIZED HEALTH CARE EDUCATION/TRAINING PROGRAM

## 2024-06-12 RX ORDER — AMOXICILLIN AND CLAVULANATE POTASSIUM 875; 125 MG/1; MG/1
1 TABLET, FILM COATED ORAL 2 TIMES DAILY
Qty: 14 TABLET | Refills: 0 | Status: ACTIVE | OUTPATIENT
Start: 2024-06-12 | End: 2024-06-19

## 2024-06-12 RX ADMIN — AMPICILLIN AND SULBACTAM 3 G: 1; 2 INJECTION, POWDER, FOR SOLUTION INTRAMUSCULAR; INTRAVENOUS at 05:26

## 2024-06-12 ASSESSMENT — PATIENT HEALTH QUESTIONNAIRE - PHQ9
1. LITTLE INTEREST OR PLEASURE IN DOING THINGS: NOT AT ALL
SUM OF ALL RESPONSES TO PHQ9 QUESTIONS 1 AND 2: 0
2. FEELING DOWN, DEPRESSED, IRRITABLE, OR HOPELESS: NOT AT ALL

## 2024-06-12 NOTE — CARE PLAN
The patient is Watcher - Medium risk of patient condition declining or worsening    Shift Goals  Clinical Goals: Pts neck swelling will decrease throughout shift  Patient Goals: rest    Progress made toward(s) clinical / shift goals:  Pts neck swelling has remained the same since being transfer from the ED to S6. Pt states that he has no difficulty swallowing or breathing at this time.    Patient is not progressing towards the following goals: NA

## 2024-06-12 NOTE — DISCHARGE SUMMARY
Discharge Summary    CHIEF COMPLAINT ON ADMISSION  Chief Complaint   Patient presents with    Sent by MD     Sent from [facility] by private vehicle. for swollen left neck lymph node x5 days after using massager on neck about 5 days ago. Fevers and sore neck for ten days off and on resolved a couple days ago. Hx of dental surgery in December.       Reason for Admission  Neck Swelling/Sent bt MD     Admission Date  6/11/2024    CODE STATUS  Full Code    HPI & HOSPITAL COURSE    Paulo Fournier is a 48 yr old male with no past medical history. Admitted 6/11 for left neck swelling. Patient transferred from [facility]. Patient states he has had fever, chills and neck swelling for 10 days. No ear pain. Intermittent cough is present. Patient had dental extraction in Jan 2024; uncomplicated.  CT neck with diffuse soft tissue thickening with suspected 8 mm mass.  Positive for group a strep by PCR.  ENT evaluated and recommended discharge home on Augmentin and follow-up in clinic in 2 weeks.    Patient seen and examined at bedside.  His vitals remained stable, remained afebrile.  Labs with normal white count.  Patient to be discharged with close follow-up with ENT  At the time of discharge patient remain hemodynamically stable ,asymptomatic ,labs remain unremarkable .  Patient will be discharged with close follow up with PCP .Discharge plan was discussed with patient in details .  Patient agreed with discharge plan  and  all questions answered.        Therefore, he is discharged in good and stable condition to home with close outpatient follow-up.    The patient met 2-midnight criteria for an inpatient stay at the time of discharge.    Discharge Date  6/12/2024        DISCHARGE DIAGNOSES  Principal Problem:    Neck mass (POA: Yes)  Active Problems:    Bandemia (POA: Unknown)  Resolved Problems:    * No resolved hospital problems. *      FOLLOW UP    Benigno Hyde M.D.  [address]  [id number]  [phone]    Follow up in 2 week(s)        MEDICATIONS ON DISCHARGE     Medication List        START taking these medications        Instructions   amoxicillin-clavulanate 875-125 MG Tabs  Commonly known as: Augmentin   Take 1 Tablet by mouth 2 times a day for 7 days.  Dose: 1 Tablet              Allergies  No Known Allergies    DIET  Orders Placed This Encounter   Procedures    Diet Order Diet: Level 6 - Soft and Bite Sized; Liquid level: Level 0 - Thin     Standing Status:   Standing     Number of Occurrences:   1     Order Specific Question:   Diet:     Answer:   Level 6 - Soft and Bite Sized [23]     Order Specific Question:   Liquid level     Answer:   Level 0 - Thin       ACTIVITY  As tolerated.  Weight bearing as tolerated    CONSULTATIONS  ENT     PROCEDURES  DX-CHEST-PORTABLE (1 VIEW)   Final Result      No acute cardiopulmonary abnormality.      OUTSIDE IMAGES-CT NECK   Final Result            LABORATORY  Lab Results   Component Value Date    SODIUM 140 06/12/2024    POTASSIUM 4.4 06/12/2024    CHLORIDE 106 06/12/2024    CO2 19 (L) 06/12/2024    GLUCOSE 144 (H) 06/12/2024    BUN 14 06/12/2024    CREATININE 0.72 06/12/2024        Lab Results   Component Value Date    WBC 7.6 06/12/2024    HEMOGLOBIN 14.0 06/12/2024    HEMATOCRIT 40.5 (L) 06/12/2024    PLATELETCT 393 06/12/2024        Total time of the discharge process exceeds 36  minutes.

## 2024-06-12 NOTE — CARE PLAN
The patient is Stable - Low risk of patient condition declining or worsening    Shift Goals  Clinical Goals: IV abx  Patient Goals: rest      Problem: Knowledge Deficit - Standard  Goal: Patient and family/care givers will demonstrate understanding of plan of care, disease process/condition, diagnostic tests and medications  Outcome: Progressing     Problem: Skin Integrity  Goal: Skin integrity is maintained or improved  Outcome: Progressing     Problem: Respiratory  Goal: Patient will achieve/maintain optimum respiratory ventilation and gas exchange  Outcome: Progressing     Problem: Infection - Standard  Goal: Patient will remain free from infection  Outcome: Progressing

## 2024-06-12 NOTE — PROGRESS NOTES
4 Eyes Skin Assessment Completed by ONI Hall and ONI BONNER.    Head WDL  Ears WDL  Nose WDL  Mouth WDL  Neck  MASS/ SWELLING; TENDER TO TOUCH  Breast/Chest Redness and Rash due to itching from sweating  Shoulder Blades WDL scratches from itching due to sweating  Spine WDL  (R) Arm/Elbow/Hand WDL  (L) Arm/Elbow/Hand WDL  Abdomen WDL  Groin WDL  Scrotum/Coccyx/Buttocks Redness  (R) Leg WDL  (L) Leg WDL  (R) Heel/Foot/Toe WDL dry  (L) Heel/Foot/Toe WDL dry          Devices In Places Blood Pressure Cuff      Interventions In Place Pillows    Possible Skin Injury No    Pictures Uploaded Into Epic Yes  Wound Consult Placed N/A  RN Wound Prevention Protocol Ordered No

## 2024-06-12 NOTE — THERAPY
"Speech Language Pathology   Clinical Swallow Evaluation     Patient Name: Paulo Fournier  AGE:  48 y.o., SEX:  male  Medical Record #: [id number]  Date of Service: 6/12/2024      History of Present Illness  Pt admiitted for neck swellling x5 days. Pt seen by ENT and recommended for d/c home on antibiotics. Per ENT note \"L lymphadenopathy x5 days, strep positive, diet per SLP.\"    CT 6/11-The left submandibular gland is diffusely enlarged relative to the right.   There is edema surrounding the left submandibular gland.   Enlarged lymph nodes are present throughout the neck, bilaterally most pronounced on the left at level 2 level.  The largest left level 2 level lymph node measures 3.3 cm by 2.5 cm x 2.1 cm.   No fluid collection or abscess are demonstrated.   Enlargement of the left laryngeal wall soft tissues is present.  A small mass may be present within the left lateral laryngeal wall measuring approximately 8 mm diameter.  The left aspect of the laryngeal airway is effaced.  The left aspect of the epiglottis is enlarged.   Tonsillar tissues are mildly enlarged bilaterally.   No peritonsillar abscess.   Jugular veins and carotid arteries and vertebral arteries are patent.  The left internal jugular vein is partially compressed by the adenopathy.   Right submandibular gland is unremarkable.   Bilateral parotid glands are normal in size and both exhibit small 5 mm enhancing masses.   No salivary duct dilatation or calculus.   Thyroid gland is unremarkable.   Chest 6/11-No acute cardiopulmonary abnormality.      PMHx:  No prior SLP tx at [facility]. See EMR for past medical hist.     General Information:  Vitals  O2 Delivery Device: (P) None - Room Air  Level of Consciousness: Alert, Awake               Prior Living Situation & Level of Function:  Prior Services: None  Comments: Pt eager for d/c home.     Communication: WNL  Swallowing: WNL       Oral Mechanism Evaluation:  Dentition: Scattered dentition   Facial " "Symmetry: Equal        Labial Observations: WFL   Lingual Observations: Midline  Motor Speech: WNL            Laryngeal Function:  Secretion Management: Adequate  Voice Quality: WFL        Cough: Perceptually WNL         Subjective  \"no difficulty but I don't want to choke.\"      Assessment  Current Method of Nutrition: Oral diet RG/TNO  Positioning: Lua's (60-90 degrees)  Bolus Administration: Patient    O2 Delivery Device: (P) None - Room Air  Factor(s) Affecting Performance: None  Tracheostomy : No          Swallowing Trials:  Swallowing Trials  Ice: Not tested  Thin Liquid (TN0): WFL  Soft & Bite Sized (SB6): WFL  Regular (RG7): WFL      Comments: Edema to left side of neck area that is firm to touch. Pt denies odynophagia or current pain at this area. Pt with intermittent shaking of his hands and is eager to d/c home. Nurs informed of shakiness. No delay in swallow or s/s of difficulty with TNO, 1/2 cup diced fruit, and one jose cracker. Probable slowness in mastication of food items related to limited dentition. No oral residue post food items. Pt again denied odynophagia and also denied globus during the swallow eval. Pt stating limited intake to fluids for the past 5 days and verbalizes concerns that he wants to avoid choking. SLP provided educ regarding slow rate for SB6. Pt stating he would aslo like to eat puree food items per his preference. Pt recommended to sit up in a chair for all intake and to use frequent TNO rinse. Pt to avoid any food items that are difficult for him to swallow. Pt recommended to call MD if he has any changes in his swallow, level of throat pain, or with respiration. Pt verbalized understanding.       Clinical Impressions  Pt not demonstrating s/s of dysphagia with SB6/TNO currently recommended. Pt also recommended for puree per his preference. SLP collaborated with RN. Probable d/c later today.     Recommendations  Diet Consistency: (P) SB6/TNO and puree as " "tolerated.  Instrumentation: (P) None indicated at this time  Medication: (P) Whole with liquid, Whole with puree, Crush with applesauce, as appropriate  Supervision: (P) Independent  Positioning: (P) Fully upright and midline during oral intake  Risk Management : (P) Small bites/sips, Alternate bites and sips, Slow rate of intake, Physical mobility, as tolerated  Oral Care: (P) Q8h  Consult Referral(s): (P) ENT (ENT is following pt)      SLP Treatment Plan  Treatment Plan: (P) Dysphagia Treatment  SLP Frequency: (P) 3x Per Week  Estimated Duration: (P) Until Therapy Goals Met      Anticipated Discharge Needs  Discharge Recommendations: (P) Anticipate that the patient will have no further speech therapy needs after discharge from the hospital   Therapy Recommendations Upon DC: (P) Not Indicated        Patient / Family Goals  Patient / Family Goal #1: (P) \"No difficulty but I don't want to choke.\"  Goal #1 Outcome: (P) Progressing as expected  Short Term Goals  Short Term Goal # 1: (P) Pt will consume SB6/TNO and puree as tolerated and without s/s of difficulty or dysphagia related pulmonary complications.  Goal Outcome # 1: (P) Goal not met      Rafia Lua, FAVIAN   "

## 2024-06-13 ENCOUNTER — TELEPHONE (OUTPATIENT)
Dept: HEALTH INFORMATION MANAGEMENT | Facility: OTHER | Age: 49
End: 2024-06-13
Payer: COMMERCIAL

## 2024-06-16 LAB
BACTERIA BLD CULT: NORMAL
BACTERIA BLD CULT: NORMAL
SIGNIFICANT IND 70042: NORMAL
SIGNIFICANT IND 70042: NORMAL
SITE SITE: NORMAL
SITE SITE: NORMAL
SOURCE SOURCE: NORMAL
SOURCE SOURCE: NORMAL

## 2024-12-13 ENCOUNTER — OFFICE VISIT (OUTPATIENT)
Dept: URGENT CARE | Facility: CLINIC | Age: 49
End: 2024-12-13
Payer: COMMERCIAL

## 2024-12-13 VITALS
DIASTOLIC BLOOD PRESSURE: 88 MMHG | HEIGHT: 69 IN | TEMPERATURE: 98.2 F | RESPIRATION RATE: 18 BRPM | SYSTOLIC BLOOD PRESSURE: 134 MMHG | OXYGEN SATURATION: 100 % | HEART RATE: 72 BPM | WEIGHT: 160 LBS | BODY MASS INDEX: 23.7 KG/M2

## 2024-12-13 DIAGNOSIS — K62.89 RECTAL PAIN: ICD-10-CM

## 2024-12-13 DIAGNOSIS — K59.00 CONSTIPATION, UNSPECIFIED CONSTIPATION TYPE: ICD-10-CM

## 2024-12-13 PROCEDURE — 99203 OFFICE O/P NEW LOW 30 MIN: CPT | Performed by: FAMILY MEDICINE

## 2024-12-13 PROCEDURE — 3079F DIAST BP 80-89 MM HG: CPT | Performed by: FAMILY MEDICINE

## 2024-12-13 PROCEDURE — 3075F SYST BP GE 130 - 139MM HG: CPT | Performed by: FAMILY MEDICINE

## 2024-12-13 RX ORDER — LIDOCAINE HYDROCHLORIDE AND HYDROCORTISONE ACETATE 30; 25 MG/G; MG/G
1 GEL RECTAL EVERY 12 HOURS
Qty: 1 KIT | Refills: 0 | Status: SHIPPED | OUTPATIENT
Start: 2024-12-13 | End: 2024-12-18

## 2024-12-13 ASSESSMENT — FIBROSIS 4 INDEX: FIB4 SCORE: 0.47

## 2024-12-13 ASSESSMENT — ENCOUNTER SYMPTOMS: CONSTIPATION: 1

## 2024-12-13 NOTE — PROGRESS NOTES
"Subjective     Paulo Fournier is a 49 y.o. male who presents with Constipation (X2 months, Abdominal discomfort and requesting GI referral )    This is a  new problem with uncertain prognosis:   This is a very pleasant 49 y.o. who has come to the walk-in clinic today for 6 weeks ago felt some internal anal rectal area pain and some itching.  Has been constipated and having hard stools for a couple of months and straining when he goes.  Denies any rectal bleeding.  Wants to have a referral to a gastroenterologist and primary care as as has not had a checkup in a while.  No associated nausea vomiting fevers chills.  Eating drinking okay.  No weight changes.  He feels on the outside there is nothing abnormal except some minor itching.  Denies any trauma.  Says symptoms have improved since last month maybe 50 or 60% better now.          ALLERGIES:  Patient has no known allergies.     PMH:  History reviewed. No pertinent past medical history.     PSH:  History reviewed. No pertinent surgical history.    MEDS:    Current Outpatient Medications:     Lidocaine-Hydrocortisone Ace 3-2.5 % Kit, Insert 1 Application into the rectum every 12 hours for 5 days., Disp: 1 Kit, Rfl: 0    ** I have documented what I find to be significant in regards to past medical, social, family and surgical history  in my HPI or under PMH/PSH/FH review section, otherwise it is noncontributory **           HPI    Review of Systems   Gastrointestinal:  Positive for constipation.   All other systems reviewed and are negative.             Objective     /88   Pulse 72   Temp 36.8 °C (98.2 °F) (Temporal)   Resp 18   Ht 1.753 m (5' 9\")   Wt 72.6 kg (160 lb)   SpO2 100%   BMI 23.63 kg/m²      Physical Exam  Vitals and nursing note reviewed.   Constitutional:       General: He is not in acute distress.     Appearance: Normal appearance. He is well-developed. He is not ill-appearing, toxic-appearing or diaphoretic.   HENT:      Head: " Normocephalic.   Cardiovascular:      Rate and Rhythm: Normal rate and regular rhythm.   Pulmonary:      Effort: Pulmonary effort is normal. No respiratory distress.   Abdominal:      Palpations: Abdomen is soft.      Tenderness: There is no abdominal tenderness.      Comments: No perianal lesions   Neurological:      Mental Status: He is alert.      Motor: No abnormal muscle tone.   Psychiatric:         Mood and Affect: Mood normal.         Behavior: Behavior normal.                             Assessment & Plan     1. Constipation, unspecified constipation type  Referral to Gastroenterology    Referral to establish with PCP      2. Rectal pain  Referral to Gastroenterology    Referral to establish with PCP    Lidocaine-Hydrocortisone Ace 3-2.5 % Kit          - Dx, plan & d/c instructions discussed   - OTC MiraLAX 3-5 times a week as needed    Follow up with your regular primary care providers office within a week to keep them updated and informed of this visit and for regular routine health maintenance check-ups. ER if not improving in 2-3 days or if feeling/getting worse. (If you do not have a primary care provider and need to schedule one you may call [facility] at [phone] to do this).    Patient left in stable condition       Discussed if any testing, labs or imaging studies are obtained outside of the [facility] facility, it is their responsibility to contact the Urgent Care and let us know that it was done and get us the results so adequate follow up can be initiated    Pertinent prior lab work and/or imaging studies in Epic have been reviewed by me today on day of this visit and taken into account for my treatment and plan today    Pertinent PMH/PSH and/or chronic conditions and medications if any were reviewed today and taken into account for my treatment and plan today    Pertinent prior office visit notes in [facility] have been reviewed by me today on day of this visit.    Please note that this dictation may have  been created using voice recognition software, if so I have made every reasonable attempt to correct obvious errors, but I expect that there are errors of grammar and possibly content that I did not discover before finalizing the note.

## 2024-12-23 ENCOUNTER — TELEPHONE (OUTPATIENT)
Dept: HEALTH INFORMATION MANAGEMENT | Facility: OTHER | Age: 49
End: 2024-12-23
Payer: COMMERCIAL

## 2025-02-18 ENCOUNTER — OFFICE VISIT (OUTPATIENT)
Dept: MEDICAL GROUP | Facility: MEDICAL CENTER | Age: 50
End: 2025-02-18
Attending: FAMILY MEDICINE
Payer: COMMERCIAL

## 2025-02-18 VITALS
RESPIRATION RATE: 16 BRPM | HEIGHT: 69 IN | TEMPERATURE: 97.4 F | OXYGEN SATURATION: 96 % | BODY MASS INDEX: 25.62 KG/M2 | DIASTOLIC BLOOD PRESSURE: 72 MMHG | WEIGHT: 173 LBS | HEART RATE: 92 BPM | SYSTOLIC BLOOD PRESSURE: 118 MMHG

## 2025-02-18 DIAGNOSIS — Z13.0 SCREENING FOR ENDOCRINE, NUTRITIONAL, METABOLIC AND IMMUNITY DISORDER: ICD-10-CM

## 2025-02-18 DIAGNOSIS — Z23 NEED FOR VACCINATION: ICD-10-CM

## 2025-02-18 DIAGNOSIS — R35.0 FREQUENT URINATION: ICD-10-CM

## 2025-02-18 DIAGNOSIS — Z13.29 SCREENING FOR ENDOCRINE, NUTRITIONAL, METABOLIC AND IMMUNITY DISORDER: ICD-10-CM

## 2025-02-18 DIAGNOSIS — K64.9 HEMORRHOIDS, UNSPECIFIED HEMORRHOID TYPE: ICD-10-CM

## 2025-02-18 DIAGNOSIS — Z12.11 SCREENING FOR COLORECTAL CANCER: ICD-10-CM

## 2025-02-18 DIAGNOSIS — Z13.21 SCREENING FOR ENDOCRINE, NUTRITIONAL, METABOLIC AND IMMUNITY DISORDER: ICD-10-CM

## 2025-02-18 DIAGNOSIS — K59.00 CONSTIPATION, UNSPECIFIED CONSTIPATION TYPE: ICD-10-CM

## 2025-02-18 DIAGNOSIS — Z13.228 SCREENING FOR ENDOCRINE, NUTRITIONAL, METABOLIC AND IMMUNITY DISORDER: ICD-10-CM

## 2025-02-18 DIAGNOSIS — Z12.12 SCREENING FOR COLORECTAL CANCER: ICD-10-CM

## 2025-02-18 PROCEDURE — 3074F SYST BP LT 130 MM HG: CPT

## 2025-02-18 PROCEDURE — 3078F DIAST BP <80 MM HG: CPT

## 2025-02-18 PROCEDURE — 99213 OFFICE O/P EST LOW 20 MIN: CPT

## 2025-02-18 PROCEDURE — 99213 OFFICE O/P EST LOW 20 MIN: CPT | Mod: 25

## 2025-02-18 PROCEDURE — 90471 IMMUNIZATION ADMIN: CPT

## 2025-02-18 ASSESSMENT — FIBROSIS 4 INDEX: FIB4 SCORE: 0.47

## 2025-02-18 ASSESSMENT — PATIENT HEALTH QUESTIONNAIRE - PHQ9: CLINICAL INTERPRETATION OF PHQ2 SCORE: 0

## 2025-02-18 NOTE — PROGRESS NOTES
Verbal consent was acquired by the patient to use [employer] ambient listening note generation during this visit     Subjective:     CC:  Diagnoses of Constipation, unspecified constipation type, Hemorrhoids, unspecified hemorrhoid type, Frequent urination, Screening for colorectal cancer, Screening for endocrine, nutritional, metabolic and immunity disorder, and Need for vaccination were pertinent to this visit.    HISTORY OF THE PRESENT ILLNESS: Patient is a 49 y.o. male.     History of Present Illness  The patient is a 49-year-old male who presents to establish care.    He has not sought medical attention for an extended period and now wishes to prioritize his health. He reports no recent weight loss, fevers, or chills. He maintains an active lifestyle, including walking and cycling, and adheres to a healthy diet. He has no known allergies. He has no significant surgical history as an adult, although he underwent surgery as a toddler. He has limited medical history, having not consulted a physician in approximately 10 years. He is not currently sexually active.    A few months prior, he experienced gastrointestinal disturbances, which have largely subsided. He recalls experiencing anal swelling and itching, which prompted a visit to [facility]. These symptoms have since lessened, although occasional itching persists. He has been managing this with Preparation H suppositories, which provide some relief. He has never had hemorrhoids before. He has never undergone a colonoscopy and is seeking a referral to a gastroenterologist or proctologist.    He also reports occasional difficulty initiating urination and increased urinary frequency.    His last tetanus vaccine was a while back. He declines influenza vaccine.    Supplemental Information  In June 2024, he had a CT scan of his neck due to swelling, which was attributed to strep infection. He completed a course of antibiotics, resulting in resolution of the  swelling.    SOCIAL HISTORY  He does not smoke or vape. He used to drink alcohol but does not anymore. He does not smoke marijuana.    FAMILY HISTORY  His father had high blood pressure and passed away from heart conditions. His mother had breast cancer and passed away from stomach cancer. No other family history of cancer.    ALLERGIES  The patient has no known allergies.    MEDICATIONS  Current: Preparation H suppositories    IMMUNIZATIONS  He is due for his tetanus vaccine.    Health Maintenance: reviewed and completed per patient preference.     ROS:   PER HPI.           Social History     Socioeconomic History    Marital status: Single     Spouse name: Not on file    Number of children: Not on file    Years of education: Not on file    Highest education level: Not on file   Occupational History    Not on file   Tobacco Use    Smoking status: Never    Smokeless tobacco: Never   Vaping Use    Vaping status: Never Used   Substance and Sexual Activity    Alcohol use: Not Currently    Drug use: Not Currently    Sexual activity: Not Currently   Other Topics Concern    Not on file   Social History Narrative    Not on file     Social Drivers of Health     Financial Resource Strain: Not on file   Food Insecurity: No Food Insecurity (6/11/2024)    Hunger Vital Sign     Worried About Running Out of Food in the Last Year: Never true     Ran Out of Food in the Last Year: Never true   Transportation Needs: No Transportation Needs (6/11/2024)    PRAPARE - Transportation     Lack of Transportation (Medical): No     Lack of Transportation (Non-Medical): No   Physical Activity: Not on file   Stress: Not on file   Social Connections: Not on file   Intimate Partner Violence: Not At Risk (6/11/2024)    Humiliation, Afraid, Rape, and Kick questionnaire     Fear of Current or Ex-Partner: No     Emotionally Abused: No     Physically Abused: No     Sexually Abused: No   Housing Stability: Low Risk  (6/11/2024)    Housing Stability  "Vital Sign     Unable to Pay for Housing in the Last Year: No     Number of Places Lived in the Last Year: 2     Unstable Housing in the Last Year: No      No Known Allergies       No current outpatient medications on file.   Objective:     Exam: /72 (BP Location: Left arm, Patient Position: Sitting, BP Cuff Size: Adult)   Pulse 92   Temp 36.3 °C (97.4 °F) (Temporal)   Resp 16   Ht 1.753 m (5' 9\")   Wt 78.5 kg (173 lb)   SpO2 96%  Body mass index is 25.55 kg/m².    Physical Exam:  Constitutional: Alert, no distress, well-groomed.  Skin: Warm, dry, good turgor, no rashes in visible areas.  Eye: Equal, round and reactive, conjunctiva clear, lids normal.  ENMT: Lips without lesions, good dentition, moist mucous membranes.  Neck: Trachea midline, no masses, no thyromegaly.  Respiratory: Unlabored respiratory effort, no cough.  Abd: soft, non tender, non distended, normal BS  MSK: Normal gait, moves all extremities.  Neuro: Grossly non-focal.   Psych: Alert and oriented x3, normal affect and mood.    Labs: Reviewed    Assessment & Plan:   49 y.o. male with the following -    1. Constipation, unspecified constipation type    2. Hemorrhoids, unspecified hemorrhoid type    3. Frequent urination    4. Screening for colorectal cancer  - Referral to GI for Colonoscopy    5. Screening for endocrine, nutritional, metabolic and immunity disorder  - HIV AG/AB COMBO ASSAY SCREENING; Future  - Comp Metabolic Panel; Future  - CBC WITHOUT DIFFERENTIAL; Future  - HEMOGLOBIN A1C; Future  - Lipid Profile; Future  - TSH WITH REFLEX TO FT4; Future  - VITAMIN D,25 HYDROXY (DEFICIENCY); Future  - PROSTATE SPECIFIC AG DIAGNOSTIC; Future  - HEP C VIRUS ANTIBODY; Future  - HEP B SURFACE AB; Future    6. Need for vaccination  - Tdap Vaccine =>6YO IM      Assessment & Plan  1. Health maintenance.  His previous laboratory results were within normal limits. Comprehensive blood work will be conducted to assess kidney function, liver " function, electrolyte balance, complete blood count, thyroid function, and cholesterol levels. A one-time hepatitis screening will also be performed. He will receive a Tdap vaccine today. He will be contacted via [email or web address] once the results are available.    2. Suspected hemorrhoids.  He reports a history of gastrointestinal issues, including swelling and itching in the anus, which have partially resolved with the use of Preparation H suppositories. A referral to a gastroenterologist for a colonoscopy will be initiated to rule out any underlying conditions.    3. Suspected benign prostatic hyperplasia.  He reports occasional difficulty initiating urination and increased frequency. A PSA test will be included in the blood work to evaluate for prostate issues.    4. Encounter for immunization.  He is due for his tetanus vaccine. He will receive a Tdap vaccine today.          Return if symptoms worsen or fail to improve.    Please note that this dictation was created using voice recognition software. I have made every reasonable attempt to correct obvious errors, but I expect that there are errors of grammar and possibly content that I did not discover before finalizing the note.

## 2025-03-04 ENCOUNTER — HOSPITAL ENCOUNTER (OUTPATIENT)
Dept: LAB | Facility: MEDICAL CENTER | Age: 50
End: 2025-03-04
Payer: COMMERCIAL

## 2025-03-04 DIAGNOSIS — Z13.21 SCREENING FOR ENDOCRINE, NUTRITIONAL, METABOLIC AND IMMUNITY DISORDER: ICD-10-CM

## 2025-03-04 DIAGNOSIS — Z13.29 SCREENING FOR ENDOCRINE, NUTRITIONAL, METABOLIC AND IMMUNITY DISORDER: ICD-10-CM

## 2025-03-04 DIAGNOSIS — Z13.0 SCREENING FOR ENDOCRINE, NUTRITIONAL, METABOLIC AND IMMUNITY DISORDER: ICD-10-CM

## 2025-03-04 DIAGNOSIS — Z13.228 SCREENING FOR ENDOCRINE, NUTRITIONAL, METABOLIC AND IMMUNITY DISORDER: ICD-10-CM

## 2025-03-04 LAB
ERYTHROCYTE [DISTWIDTH] IN BLOOD BY AUTOMATED COUNT: 44.2 FL (ref 35.9–50)
EST. AVERAGE GLUCOSE BLD GHB EST-MCNC: 103 MG/DL
HBA1C MFR BLD: 5.2 % (ref 4–5.6)
HBV SURFACE AB SERPL IA-ACNC: <3.5 MIU/ML (ref 0–10)
HCT VFR BLD AUTO: 45.7 % (ref 42–52)
HCV AB SER QL: NORMAL
HGB BLD-MCNC: 15.2 G/DL (ref 14–18)
HIV 1+2 AB+HIV1 P24 AG SERPL QL IA: NORMAL
MCH RBC QN AUTO: 32.3 PG (ref 27–33)
MCHC RBC AUTO-ENTMCNC: 33.3 G/DL (ref 32.3–36.5)
MCV RBC AUTO: 97.2 FL (ref 81.4–97.8)
PLATELET # BLD AUTO: 309 K/UL (ref 164–446)
PMV BLD AUTO: 9 FL (ref 9–12.9)
RBC # BLD AUTO: 4.7 M/UL (ref 4.7–6.1)
WBC # BLD AUTO: 7.8 K/UL (ref 4.8–10.8)

## 2025-03-04 PROCEDURE — 82306 VITAMIN D 25 HYDROXY: CPT

## 2025-03-04 PROCEDURE — 80061 LIPID PANEL: CPT

## 2025-03-04 PROCEDURE — 86706 HEP B SURFACE ANTIBODY: CPT

## 2025-03-04 PROCEDURE — 87389 HIV-1 AG W/HIV-1&-2 AB AG IA: CPT

## 2025-03-04 PROCEDURE — 84443 ASSAY THYROID STIM HORMONE: CPT

## 2025-03-04 PROCEDURE — 80053 COMPREHEN METABOLIC PANEL: CPT

## 2025-03-04 PROCEDURE — 83036 HEMOGLOBIN GLYCOSYLATED A1C: CPT

## 2025-03-04 PROCEDURE — 86803 HEPATITIS C AB TEST: CPT

## 2025-03-04 PROCEDURE — 84153 ASSAY OF PSA TOTAL: CPT

## 2025-03-04 PROCEDURE — 36415 COLL VENOUS BLD VENIPUNCTURE: CPT

## 2025-03-04 PROCEDURE — 85027 COMPLETE CBC AUTOMATED: CPT

## 2025-03-05 ENCOUNTER — RESULTS FOLLOW-UP (OUTPATIENT)
Dept: MEDICAL GROUP | Facility: MEDICAL CENTER | Age: 50
End: 2025-03-05

## 2025-03-05 DIAGNOSIS — E55.9 VITAMIN D DEFICIENCY: ICD-10-CM

## 2025-03-05 LAB
25(OH)D3 SERPL-MCNC: 20 NG/ML (ref 30–100)
ALBUMIN SERPL BCP-MCNC: 4.2 G/DL (ref 3.2–4.9)
ALBUMIN/GLOB SERPL: 1.7 G/DL
ALP SERPL-CCNC: 63 U/L (ref 30–99)
ALT SERPL-CCNC: 18 U/L (ref 2–50)
ANION GAP SERPL CALC-SCNC: 11 MMOL/L (ref 7–16)
AST SERPL-CCNC: 23 U/L (ref 12–45)
BILIRUB SERPL-MCNC: 0.3 MG/DL (ref 0.1–1.5)
BUN SERPL-MCNC: 13 MG/DL (ref 8–22)
CALCIUM ALBUM COR SERPL-MCNC: 8.8 MG/DL (ref 8.5–10.5)
CALCIUM SERPL-MCNC: 9 MG/DL (ref 8.5–10.5)
CHLORIDE SERPL-SCNC: 103 MMOL/L (ref 96–112)
CHOLEST SERPL-MCNC: 182 MG/DL (ref 100–199)
CO2 SERPL-SCNC: 24 MMOL/L (ref 20–33)
CREAT SERPL-MCNC: 0.95 MG/DL (ref 0.5–1.4)
GFR SERPLBLD CREATININE-BSD FMLA CKD-EPI: 98 ML/MIN/1.73 M 2
GLOBULIN SER CALC-MCNC: 2.5 G/DL (ref 1.9–3.5)
GLUCOSE SERPL-MCNC: 96 MG/DL (ref 65–99)
HDLC SERPL-MCNC: 54 MG/DL
LDLC SERPL CALC-MCNC: 115 MG/DL
POTASSIUM SERPL-SCNC: 4.6 MMOL/L (ref 3.6–5.5)
PROT SERPL-MCNC: 6.7 G/DL (ref 6–8.2)
PSA SERPL DL<=0.01 NG/ML-MCNC: 1.13 NG/ML (ref 0–4)
SODIUM SERPL-SCNC: 138 MMOL/L (ref 135–145)
TRIGL SERPL-MCNC: 67 MG/DL (ref 0–149)
TSH SERPL DL<=0.005 MIU/L-ACNC: 3.66 UIU/ML (ref 0.38–5.33)

## 2025-03-05 RX ORDER — ERGOCALCIFEROL 1.25 MG/1
50000 CAPSULE ORAL
Qty: 12 CAPSULE | Refills: 0 | Status: SHIPPED | OUTPATIENT
Start: 2025-03-05

## 2025-03-27 ENCOUNTER — OFFICE VISIT (OUTPATIENT)
Dept: MEDICAL GROUP | Facility: MEDICAL CENTER | Age: 50
End: 2025-03-27
Payer: COMMERCIAL

## 2025-03-27 VITALS
SYSTOLIC BLOOD PRESSURE: 118 MMHG | OXYGEN SATURATION: 96 % | RESPIRATION RATE: 16 BRPM | WEIGHT: 181 LBS | BODY MASS INDEX: 26.73 KG/M2 | DIASTOLIC BLOOD PRESSURE: 78 MMHG | HEART RATE: 86 BPM | TEMPERATURE: 98.9 F

## 2025-03-27 DIAGNOSIS — E78.00 ELEVATED LDL CHOLESTEROL LEVEL: ICD-10-CM

## 2025-03-27 DIAGNOSIS — L29.0 RECTAL ITCHING: ICD-10-CM

## 2025-03-27 DIAGNOSIS — E55.9 VITAMIN D DEFICIENCY: ICD-10-CM

## 2025-03-27 PROCEDURE — 3074F SYST BP LT 130 MM HG: CPT

## 2025-03-27 PROCEDURE — 3078F DIAST BP <80 MM HG: CPT

## 2025-03-27 PROCEDURE — 99212 OFFICE O/P EST SF 10 MIN: CPT

## 2025-03-27 PROCEDURE — 99213 OFFICE O/P EST LOW 20 MIN: CPT

## 2025-03-27 ASSESSMENT — FIBROSIS 4 INDEX: FIB4 SCORE: 0.86

## 2025-03-27 NOTE — PROGRESS NOTES
Verbal consent was acquired by the patient to use [employer] ambient listening note generation during this visit     Subjective:     CC:  Diagnoses of Rectal itching, Elevated LDL cholesterol level, and Vitamin D deficiency were pertinent to this visit.    HISTORY OF THE PRESENT ILLNESS: Patient is a 49 y.o. male.     History of Present Illness  The patient is a 49-year-old male who presents for evaluation of anal itching and elevated LDL.    He has been experiencing intermittent anal itching since the end of 2024, which he attributes to his physical activities such as walking and running. The symptoms have largely subsided but occasionally recur, particularly during periods of increased walking activity. He has not observed any blood in his stool but notes occasional irregularities. He has a history of hemorrhoids and has been managing constipation with stool softeners, which have proven effective. He has been self-managing with Preparation H and similar products, which provide some relief.    He reports that his LDL levels were slightly elevated on his recent lab work.    He has been supplementing with vitamin D from his home supply.    He is interested in getting a referral for colonoscopy as he has never had one before.    MEDICATIONS  Current: Preparation H, stool softener, vitamin D    Health Maintenance: reviewed and completed per patient preference.     ROS:   PER HPI.           Social History     Socioeconomic History    Marital status: Single     Spouse name: Not on file    Number of children: Not on file    Years of education: Not on file    Highest education level: Not on file   Occupational History    Not on file   Tobacco Use    Smoking status: Never    Smokeless tobacco: Never   Vaping Use    Vaping status: Never Used   Substance and Sexual Activity    Alcohol use: Not Currently    Drug use: Not Currently    Sexual activity: Not Currently   Other Topics Concern    Not on file   Social History  Narrative    Not on file     Social Drivers of Health     Financial Resource Strain: Not on file   Food Insecurity: No Food Insecurity (6/11/2024)    Hunger Vital Sign     Worried About Running Out of Food in the Last Year: Never true     Ran Out of Food in the Last Year: Never true   Transportation Needs: No Transportation Needs (6/11/2024)    PRAPARE - Transportation     Lack of Transportation (Medical): No     Lack of Transportation (Non-Medical): No   Physical Activity: Not on file   Stress: Not on file   Social Connections: Not on file   Intimate Partner Violence: Not At Risk (6/11/2024)    Humiliation, Afraid, Rape, and Kick questionnaire     Fear of Current or Ex-Partner: No     Emotionally Abused: No     Physically Abused: No     Sexually Abused: No   Housing Stability: Low Risk  (6/11/2024)    Housing Stability Vital Sign     Unable to Pay for Housing in the Last Year: No     Number of Places Lived in the Last Year: 2     Unstable Housing in the Last Year: No      No Known Allergies         Current Outpatient Medications:     ergocalciferol, 50,000 Units, Oral, Q7 DAYS   Objective:     Exam: /78 (BP Location: Right arm, Patient Position: Sitting, BP Cuff Size: Adult)   Pulse 86   Temp 37.2 °C (98.9 °F) (Temporal)   Resp 16   Wt 82.1 kg (181 lb)   SpO2 96%  Body mass index is 26.73 kg/m².    Physical Exam:  Constitutional: Alert, no distress, well-groomed.  Skin: Warm, dry, good turgor, no rashes in visible areas.  Eye: Equal, round and reactive, conjunctiva clear, lids normal.  ENMT: Lips without lesions, good dentition, moist mucous membranes.  Neck: Trachea midline, no masses, no thyromegaly.  Respiratory: Unlabored respiratory effort, no cough.  Abd: soft, non tender, non distended, normal BS  MSK: Normal gait, moves all extremities.  Neuro: Grossly non-focal.   Psych: Alert and oriented x3, normal affect and mood.    Labs: Reviewed    Assessment & Plan:   49 y.o. male with the following  -    1. Rectal itching    2. Elevated LDL cholesterol level    3. Vitamin D deficiency      Assessment & Plan  1. Anal pruritus.  The patient's symptoms are likely attributable to hemorrhoids, as evidenced by the relief provided by Preparation H. A referral to a gastroenterologist for a colonoscopy has been initiated. He has been advised to schedule an appointment with the gastroenterologist.    2. Elevated LDL cholesterol.  His LDL levels are slightly elevated but within a range that can be managed through lifestyle modifications. He has been counseled to eliminate junk food from his diet and increase his intake of fruits, vegetables, and lean meats.    3. Vitamin D deficiency.  His vitamin D levels are marginally low. A prescription for vitamin D supplementation has been sent to the pharmacy.    4. Health maintenance.  He needs a hepatitis B vaccine.          Return if symptoms worsen or fail to improve.    Please note that this dictation was created using voice recognition software. I have made every reasonable attempt to correct obvious errors, but I expect that there are errors of grammar and possibly content that I did not discover before finalizing the note.